# Patient Record
Sex: FEMALE | Race: WHITE | Employment: FULL TIME | URBAN - METROPOLITAN AREA
[De-identification: names, ages, dates, MRNs, and addresses within clinical notes are randomized per-mention and may not be internally consistent; named-entity substitution may affect disease eponyms.]

---

## 2018-09-27 ENCOUNTER — TELEPHONE (OUTPATIENT)
Dept: FAMILY MEDICINE CLINIC | Age: 43
End: 2018-09-27

## 2018-10-15 ENCOUNTER — OFFICE VISIT (OUTPATIENT)
Dept: FAMILY MEDICINE CLINIC | Age: 43
End: 2018-10-15
Payer: COMMERCIAL

## 2018-10-15 VITALS
HEIGHT: 63 IN | HEART RATE: 96 BPM | BODY MASS INDEX: 33.13 KG/M2 | OXYGEN SATURATION: 98 % | RESPIRATION RATE: 12 BRPM | WEIGHT: 187 LBS | DIASTOLIC BLOOD PRESSURE: 78 MMHG | SYSTOLIC BLOOD PRESSURE: 112 MMHG

## 2018-10-15 DIAGNOSIS — G43.909 MIGRAINE WITHOUT STATUS MIGRAINOSUS, NOT INTRACTABLE, UNSPECIFIED MIGRAINE TYPE: ICD-10-CM

## 2018-10-15 DIAGNOSIS — Z85.828 HISTORY OF BASAL CELL CARCINOMA (BCC): ICD-10-CM

## 2018-10-15 DIAGNOSIS — E66.9 OBESITY, CLASS I, BMI 30.0-34.9 (SEE ACTUAL BMI): Primary | ICD-10-CM

## 2018-10-15 DIAGNOSIS — E78.5 HYPERLIPIDEMIA, UNSPECIFIED HYPERLIPIDEMIA TYPE: ICD-10-CM

## 2018-10-15 PROCEDURE — 99214 OFFICE O/P EST MOD 30 MIN: CPT | Performed by: INTERNAL MEDICINE

## 2018-10-15 RX ORDER — NORETHINDRONE ACETATE AND ETHINYL ESTRADIOL 1; .02 MG/1; MG/1
TABLET ORAL
COMMUNITY
Start: 2018-01-03

## 2018-10-15 ASSESSMENT — PATIENT HEALTH QUESTIONNAIRE - PHQ9
1. LITTLE INTEREST OR PLEASURE IN DOING THINGS: 0
SUM OF ALL RESPONSES TO PHQ QUESTIONS 1-9: 0
SUM OF ALL RESPONSES TO PHQ QUESTIONS 1-9: 0
SUM OF ALL RESPONSES TO PHQ9 QUESTIONS 1 & 2: 0
2. FEELING DOWN, DEPRESSED OR HOPELESS: 0

## 2018-10-15 NOTE — PROGRESS NOTES
HPI: Lela Chery presents to establish care. Hx anxiety and depression and Effexor were not effective. States she is doing well with her current life. Not interested in medication. Is aware of counseling. No suicidal intent. Not exercising. Weight is up. No knee pain. Intermittent GE reflux. No known apnea. Not following diet. There is weight watchers in her work. Is interested in weight loss clinic referral. This is the most she has weighed. Is aware of low calorie and low carb diet. States that it works when she is able to follow through with this. No prediabetes or known hyperlipidemia. Laboratories to be done in February. Migraines with weather changes. Occur about twice a month. Excedrin for and is effective for her. BCC for dry skin. Follows yearly. Area left lateral temporal.    . Remotely abnormal Pap. Repeats normal. Up-to-date mammograms. Negative HIV. PMH    Denies hospitalizations. SH: puppy. teen smoker, no drugs, 2 alcoholic beverages weekly. Does not exercise. Her knees. phrases at work. Does not like this. Positive stressors. FH 3 older brothers    + PGM breast. DM dad, SA    - suicide, mental illness. Review of systems: Needs eye exam. Up-to-date dentist. No chronic sinus symptoms or concussions. Denies any wheezing pneumonias abnormal chest x-rays or positive TB test. No chest pain palpitations or syncope. No breast masses. Occasional GE reflux. States has had upper and lower endoscopy in the past. No polyps. History irritable bowel. No kidney stones or recurrent bladder infections. No concerns with STDs. Not sexually active. Decreased libido. Positive anxiety and sadness in the past. Positive seatbelt. Sedentary. Weight is up. .      Constitutional, ent, CV, respiratory, GI, , joint, skin, allergic and psychiatric ROS negative except for above    No Known Allergies    Outpatient Prescriptions Marked as Taking for the 10/15/18 encounter (Office Visit) with 13 03/14/2013 1152    CREATININE 0.6 03/14/2013 1152        Component Value Date/Time    CALCIUM 9.5 03/14/2013 1152    ALKPHOS 95 03/14/2013 1152    AST 23 03/14/2013 1152    ALT 22 03/14/2013 1152    BILITOT 0.40 03/14/2013 1152            Lab Results   Component Value Date    WBC 6.7 03/14/2013    HGB 14.3 03/14/2013    HCT 42.7 03/14/2013    MCV 91.6 03/14/2013     03/14/2013     No results found for: LABA1C  No results found for: EAG  No results found for: LABA1C  No components found for: CHLPL  No results found for: TRIG  No results found for: HDL  No results found for: LDLCALC  No results found for: LABVLDL    Old labs and records reviewed or requested  Discussed past lab and studies with patient      Diagnosis Orders   1. Obesity, Class I, BMI 30.0-34.9 (see actual BMI)  Sandi Car MD   2. History of basal cell carcinoma (BCC)     3. Migraine without status migrainosus, not intractable, unspecified migraine type     4. Hyperlipidemia, unspecified hyperlipidemia type       Obesity. Discussed weight loss. Information on weight loss clinic. Routine health maintenance. Exercise 30 minutes daily. We'll have labs screening in February through work. Flu vaccine already given. Migraine. Continue on with her twice monthly Excedrin. Hyperlipidemia in the past. We'll have these labs in the future. Continue with her GYN as needed    Assessment  Fullness and follow-up with Dr. Diana Navarro, psychology of interest      Return in about 1 year (around 10/15/2019). Diagnosis and treatment discussed.   Possible side effects of medication reviewed  Patients questions answered  Follow up understood  Pt aware if they are not contacted about any test results , this does not mean they are normal.  They should call

## 2018-10-15 NOTE — LETTER
[facility]  11/29/2017      October 15, 2018    [address] [facility] AT [place] [address]  Care Everywhere Result Report  CYTOLOGY GYN11/29/2017  [facility]   Component Name Value Ref Range   CYTOLOGY-GYN CYTOLOGY GYNECOLOGICAL REPORT    Name: Marika Gallardo  EPI#: [id number]  Acct#: [de-identified]    Case #: [id number]        Final Cytologic Diagnosis  A.  Cervical/Endocervical thinprep pap:   Adequacy:       Satisfactory for evaluation, endocervical transformation zone component  present. Interpretation:       Negative for intraepithelial lesion or malignancy.         This specimen has been analyzed by the ThinPrep Imaging System [employer],  an automated imaging and review system, which assists the cytotechnologist  and/or pathologist in evaluation of cells on Thinprep Pap tests.   Electronically Signed Out By [address] [place] [address] of Specimen(s)  A: Cervical/Endocervical thinprep pap      Clinical History  Date of Last Menstrual Period:     10/30/17  Signed out at [facility], [address] [facility]  [id number]      -------------- [address] Non-Formatted Report --------------             Jacob Hassan MD

## 2025-08-13 ENCOUNTER — HOSPITAL ENCOUNTER (OUTPATIENT)
Dept: GENERAL RADIOLOGY | Age: 50
Discharge: HOME | End: 2025-08-13
Payer: COMMERCIAL

## 2025-08-13 DIAGNOSIS — N20.0 KIDNEY STONE: ICD-10-CM

## 2025-08-13 PROCEDURE — 74018 RADEX ABDOMEN 1 VIEW: CPT
